# Patient Record
Sex: FEMALE | Race: OTHER | NOT HISPANIC OR LATINO | URBAN - METROPOLITAN AREA
[De-identification: names, ages, dates, MRNs, and addresses within clinical notes are randomized per-mention and may not be internally consistent; named-entity substitution may affect disease eponyms.]

---

## 2019-01-29 ENCOUNTER — OUTPATIENT (OUTPATIENT)
Dept: OUTPATIENT SERVICES | Facility: HOSPITAL | Age: 39
LOS: 1 days | End: 2019-01-29
Payer: COMMERCIAL

## 2019-01-29 DIAGNOSIS — O26.899 OTHER SPECIFIED PREGNANCY RELATED CONDITIONS, UNSPECIFIED TRIMESTER: ICD-10-CM

## 2019-01-29 DIAGNOSIS — Z3A.00 WEEKS OF GESTATION OF PREGNANCY NOT SPECIFIED: ICD-10-CM

## 2019-01-30 PROCEDURE — 59025 FETAL NON-STRESS TEST: CPT

## 2019-01-30 PROCEDURE — 99214 OFFICE O/P EST MOD 30 MIN: CPT

## 2019-01-30 PROCEDURE — 76817 TRANSVAGINAL US OBSTETRIC: CPT

## 2019-02-21 PROBLEM — Z00.00 ENCOUNTER FOR PREVENTIVE HEALTH EXAMINATION: Status: ACTIVE | Noted: 2019-02-21

## 2019-03-18 ENCOUNTER — OUTPATIENT (OUTPATIENT)
Dept: OUTPATIENT SERVICES | Facility: HOSPITAL | Age: 39
LOS: 1 days | End: 2019-03-18
Payer: COMMERCIAL

## 2019-03-18 ENCOUNTER — TRANSCRIPTION ENCOUNTER (OUTPATIENT)
Age: 39
End: 2019-03-18

## 2019-03-18 DIAGNOSIS — O26.899 OTHER SPECIFIED PREGNANCY RELATED CONDITIONS, UNSPECIFIED TRIMESTER: ICD-10-CM

## 2019-03-18 DIAGNOSIS — Z3A.00 WEEKS OF GESTATION OF PREGNANCY NOT SPECIFIED: ICD-10-CM

## 2019-03-18 LAB
ALBUMIN SERPL ELPH-MCNC: 3.6 G/DL — SIGNIFICANT CHANGE UP (ref 3.3–5)
ALP SERPL-CCNC: 160 U/L — HIGH (ref 40–120)
ALT FLD-CCNC: 20 U/L — SIGNIFICANT CHANGE UP (ref 10–45)
ANION GAP SERPL CALC-SCNC: 15 MMOL/L — SIGNIFICANT CHANGE UP (ref 5–17)
APPEARANCE UR: CLEAR — SIGNIFICANT CHANGE UP
APTT BLD: 27.7 SEC — SIGNIFICANT CHANGE UP (ref 27.5–36.3)
AST SERPL-CCNC: 18 U/L — SIGNIFICANT CHANGE UP (ref 10–40)
BASOPHILS # BLD AUTO: 0.02 K/UL — SIGNIFICANT CHANGE UP (ref 0–0.2)
BASOPHILS NFR BLD AUTO: 0.2 % — SIGNIFICANT CHANGE UP (ref 0–2)
BILIRUB SERPL-MCNC: 0.2 MG/DL — SIGNIFICANT CHANGE UP (ref 0.2–1.2)
BILIRUB UR-MCNC: NEGATIVE — SIGNIFICANT CHANGE UP
BUN SERPL-MCNC: 9 MG/DL — SIGNIFICANT CHANGE UP (ref 7–23)
CALCIUM SERPL-MCNC: 8.8 MG/DL — SIGNIFICANT CHANGE UP (ref 8.4–10.5)
CHLORIDE SERPL-SCNC: 102 MMOL/L — SIGNIFICANT CHANGE UP (ref 96–108)
CO2 SERPL-SCNC: 19 MMOL/L — LOW (ref 22–31)
COLOR SPEC: YELLOW — SIGNIFICANT CHANGE UP
CREAT ?TM UR-MCNC: 51 MG/DL — SIGNIFICANT CHANGE UP
CREAT SERPL-MCNC: 0.48 MG/DL — LOW (ref 0.5–1.3)
DIFF PNL FLD: NEGATIVE — SIGNIFICANT CHANGE UP
EOSINOPHIL # BLD AUTO: 0.08 K/UL — SIGNIFICANT CHANGE UP (ref 0–0.5)
EOSINOPHIL NFR BLD AUTO: 0.8 % — SIGNIFICANT CHANGE UP (ref 0–6)
FIBRINOGEN PPP-MCNC: 575 MG/DL — HIGH (ref 258–438)
GLUCOSE SERPL-MCNC: 93 MG/DL — SIGNIFICANT CHANGE UP (ref 70–99)
GLUCOSE UR QL: NEGATIVE — SIGNIFICANT CHANGE UP
HCT VFR BLD CALC: 37.3 % — SIGNIFICANT CHANGE UP (ref 34.5–45)
HGB BLD-MCNC: 12.7 G/DL — SIGNIFICANT CHANGE UP (ref 11.5–15.5)
IMM GRANULOCYTES NFR BLD AUTO: 1.9 % — HIGH (ref 0–1.5)
INR BLD: 0.91 — SIGNIFICANT CHANGE UP (ref 0.88–1.16)
KETONES UR-MCNC: NEGATIVE — SIGNIFICANT CHANGE UP
LDH SERPL L TO P-CCNC: 171 U/L — SIGNIFICANT CHANGE UP (ref 50–242)
LEUKOCYTE ESTERASE UR-ACNC: ABNORMAL
LYMPHOCYTES # BLD AUTO: 1.29 K/UL — SIGNIFICANT CHANGE UP (ref 1–3.3)
LYMPHOCYTES # BLD AUTO: 13 % — SIGNIFICANT CHANGE UP (ref 13–44)
MCHC RBC-ENTMCNC: 31.8 PG — SIGNIFICANT CHANGE UP (ref 27–34)
MCHC RBC-ENTMCNC: 34 GM/DL — SIGNIFICANT CHANGE UP (ref 32–36)
MCV RBC AUTO: 93.5 FL — SIGNIFICANT CHANGE UP (ref 80–100)
MONOCYTES # BLD AUTO: 0.78 K/UL — SIGNIFICANT CHANGE UP (ref 0–0.9)
MONOCYTES NFR BLD AUTO: 7.9 % — SIGNIFICANT CHANGE UP (ref 2–14)
NEUTROPHILS # BLD AUTO: 7.54 K/UL — HIGH (ref 1.8–7.4)
NEUTROPHILS NFR BLD AUTO: 76.2 % — SIGNIFICANT CHANGE UP (ref 43–77)
NITRITE UR-MCNC: NEGATIVE — SIGNIFICANT CHANGE UP
NRBC # BLD: 0 /100 WBCS — SIGNIFICANT CHANGE UP (ref 0–0)
PH UR: 7 — SIGNIFICANT CHANGE UP (ref 5–8)
PLATELET # BLD AUTO: 275 K/UL — SIGNIFICANT CHANGE UP (ref 150–400)
POTASSIUM SERPL-MCNC: 4 MMOL/L — SIGNIFICANT CHANGE UP (ref 3.5–5.3)
POTASSIUM SERPL-SCNC: 4 MMOL/L — SIGNIFICANT CHANGE UP (ref 3.5–5.3)
PROT ?TM UR-MCNC: 12 MG/DL — SIGNIFICANT CHANGE UP (ref 0–12)
PROT SERPL-MCNC: 6.9 G/DL — SIGNIFICANT CHANGE UP (ref 6–8.3)
PROT UR-MCNC: NEGATIVE MG/DL — SIGNIFICANT CHANGE UP
PROT/CREAT UR-RTO: 0.2 RATIO — SIGNIFICANT CHANGE UP (ref 0–0.2)
PROTHROM AB SERPL-ACNC: 10.3 SEC — SIGNIFICANT CHANGE UP (ref 10–12.9)
RBC # BLD: 3.99 M/UL — SIGNIFICANT CHANGE UP (ref 3.8–5.2)
RBC # FLD: 12.6 % — SIGNIFICANT CHANGE UP (ref 10.3–14.5)
SODIUM SERPL-SCNC: 136 MMOL/L — SIGNIFICANT CHANGE UP (ref 135–145)
SP GR SPEC: 1.01 — SIGNIFICANT CHANGE UP (ref 1–1.03)
URATE SERPL-MCNC: 4.6 MG/DL — SIGNIFICANT CHANGE UP (ref 2.5–7)
UROBILINOGEN FLD QL: 0.2 E.U./DL — SIGNIFICANT CHANGE UP
WBC # BLD: 9.9 K/UL — SIGNIFICANT CHANGE UP (ref 3.8–10.5)
WBC # FLD AUTO: 9.9 K/UL — SIGNIFICANT CHANGE UP (ref 3.8–10.5)

## 2019-03-18 PROCEDURE — 85025 COMPLETE CBC W/AUTO DIFF WBC: CPT

## 2019-03-18 PROCEDURE — 85384 FIBRINOGEN ACTIVITY: CPT

## 2019-03-18 PROCEDURE — 81001 URINALYSIS AUTO W/SCOPE: CPT

## 2019-03-18 PROCEDURE — 84156 ASSAY OF PROTEIN URINE: CPT

## 2019-03-18 PROCEDURE — 36415 COLL VENOUS BLD VENIPUNCTURE: CPT

## 2019-03-18 PROCEDURE — 99214 OFFICE O/P EST MOD 30 MIN: CPT

## 2019-03-18 PROCEDURE — 85610 PROTHROMBIN TIME: CPT

## 2019-03-18 PROCEDURE — 82570 ASSAY OF URINE CREATININE: CPT

## 2019-03-18 PROCEDURE — 80053 COMPREHEN METABOLIC PANEL: CPT

## 2019-03-18 PROCEDURE — 84550 ASSAY OF BLOOD/URIC ACID: CPT

## 2019-03-18 PROCEDURE — 83615 LACTATE (LD) (LDH) ENZYME: CPT

## 2019-03-18 PROCEDURE — 85730 THROMBOPLASTIN TIME PARTIAL: CPT

## 2019-03-20 ENCOUNTER — RESULT REVIEW (OUTPATIENT)
Age: 39
End: 2019-03-20

## 2019-03-21 ENCOUNTER — INPATIENT (INPATIENT)
Facility: HOSPITAL | Age: 39
LOS: 2 days | Discharge: HOME | End: 2019-03-24
Attending: OBSTETRICS & GYNECOLOGY | Admitting: OBSTETRICS & GYNECOLOGY
Payer: COMMERCIAL

## 2019-03-21 VITALS
HEART RATE: 94 BPM | SYSTOLIC BLOOD PRESSURE: 117 MMHG | HEIGHT: 61 IN | WEIGHT: 147.71 LBS | DIASTOLIC BLOOD PRESSURE: 74 MMHG

## 2019-03-21 LAB
BASOPHILS # BLD AUTO: 0.02 K/UL — SIGNIFICANT CHANGE UP (ref 0–0.2)
BASOPHILS NFR BLD AUTO: 0.2 % — SIGNIFICANT CHANGE UP (ref 0–2)
BLD GP AB SCN SERPL QL: NEGATIVE — SIGNIFICANT CHANGE UP
EOSINOPHIL # BLD AUTO: 0.08 K/UL — SIGNIFICANT CHANGE UP (ref 0–0.5)
EOSINOPHIL NFR BLD AUTO: 0.8 % — SIGNIFICANT CHANGE UP (ref 0–6)
HCT VFR BLD CALC: 36.6 % — SIGNIFICANT CHANGE UP (ref 34.5–45)
HGB BLD-MCNC: 12.6 G/DL — SIGNIFICANT CHANGE UP (ref 11.5–15.5)
IMM GRANULOCYTES NFR BLD AUTO: 1.8 % — HIGH (ref 0–1.5)
LYMPHOCYTES # BLD AUTO: 1.42 K/UL — SIGNIFICANT CHANGE UP (ref 1–3.3)
LYMPHOCYTES # BLD AUTO: 14.1 % — SIGNIFICANT CHANGE UP (ref 13–44)
MCHC RBC-ENTMCNC: 32.5 PG — SIGNIFICANT CHANGE UP (ref 27–34)
MCHC RBC-ENTMCNC: 34.4 GM/DL — SIGNIFICANT CHANGE UP (ref 32–36)
MCV RBC AUTO: 94.3 FL — SIGNIFICANT CHANGE UP (ref 80–100)
MONOCYTES # BLD AUTO: 0.76 K/UL — SIGNIFICANT CHANGE UP (ref 0–0.9)
MONOCYTES NFR BLD AUTO: 7.5 % — SIGNIFICANT CHANGE UP (ref 2–14)
NEUTROPHILS # BLD AUTO: 7.62 K/UL — HIGH (ref 1.8–7.4)
NEUTROPHILS NFR BLD AUTO: 75.6 % — SIGNIFICANT CHANGE UP (ref 43–77)
NRBC # BLD: 0 /100 WBCS — SIGNIFICANT CHANGE UP (ref 0–0)
PLATELET # BLD AUTO: 264 K/UL — SIGNIFICANT CHANGE UP (ref 150–400)
RBC # BLD: 3.88 M/UL — SIGNIFICANT CHANGE UP (ref 3.8–5.2)
RBC # FLD: 12.7 % — SIGNIFICANT CHANGE UP (ref 10.3–14.5)
RH IG SCN BLD-IMP: POSITIVE — SIGNIFICANT CHANGE UP
WBC # BLD: 10.08 K/UL — SIGNIFICANT CHANGE UP (ref 3.8–10.5)
WBC # FLD AUTO: 10.08 K/UL — SIGNIFICANT CHANGE UP (ref 3.8–10.5)

## 2019-03-21 RX ORDER — GLYCERIN ADULT
1 SUPPOSITORY, RECTAL RECTAL AT BEDTIME
Qty: 0 | Refills: 0 | Status: DISCONTINUED | OUTPATIENT
Start: 2019-03-21 | End: 2019-03-24

## 2019-03-21 RX ORDER — HEPARIN SODIUM 5000 [USP'U]/ML
5000 INJECTION INTRAVENOUS; SUBCUTANEOUS EVERY 12 HOURS
Qty: 0 | Refills: 0 | Status: DISCONTINUED | OUTPATIENT
Start: 2019-03-22 | End: 2019-03-24

## 2019-03-21 RX ORDER — OXYCODONE AND ACETAMINOPHEN 5; 325 MG/1; MG/1
1 TABLET ORAL
Qty: 0 | Refills: 0 | Status: DISCONTINUED | OUTPATIENT
Start: 2019-03-21 | End: 2019-03-24

## 2019-03-21 RX ORDER — NALOXONE HYDROCHLORIDE 4 MG/.1ML
0.1 SPRAY NASAL
Qty: 0 | Refills: 0 | Status: DISCONTINUED | OUTPATIENT
Start: 2019-03-21 | End: 2019-03-24

## 2019-03-21 RX ORDER — OXYTOCIN 10 UNIT/ML
333.33 VIAL (ML) INJECTION
Qty: 20 | Refills: 0 | Status: COMPLETED | OUTPATIENT
Start: 2019-03-21 | End: 2019-03-21

## 2019-03-21 RX ORDER — METOCLOPRAMIDE HCL 10 MG
10 TABLET ORAL ONCE
Qty: 0 | Refills: 0 | Status: DISCONTINUED | OUTPATIENT
Start: 2019-03-21 | End: 2019-03-21

## 2019-03-21 RX ORDER — LANOLIN
1 OINTMENT (GRAM) TOPICAL
Qty: 0 | Refills: 0 | Status: DISCONTINUED | OUTPATIENT
Start: 2019-03-21 | End: 2019-03-24

## 2019-03-21 RX ORDER — SODIUM CHLORIDE 9 MG/ML
1000 INJECTION, SOLUTION INTRAVENOUS
Qty: 0 | Refills: 0 | Status: DISCONTINUED | OUTPATIENT
Start: 2019-03-21 | End: 2019-03-24

## 2019-03-21 RX ORDER — OXYCODONE AND ACETAMINOPHEN 5; 325 MG/1; MG/1
2 TABLET ORAL EVERY 6 HOURS
Qty: 0 | Refills: 0 | Status: DISCONTINUED | OUTPATIENT
Start: 2019-03-21 | End: 2019-03-24

## 2019-03-21 RX ORDER — IBUPROFEN 200 MG
600 TABLET ORAL EVERY 6 HOURS
Qty: 0 | Refills: 0 | Status: DISCONTINUED | OUTPATIENT
Start: 2019-03-21 | End: 2019-03-24

## 2019-03-21 RX ORDER — OXYTOCIN 10 UNIT/ML
41.67 VIAL (ML) INJECTION
Qty: 20 | Refills: 0 | Status: DISCONTINUED | OUTPATIENT
Start: 2019-03-21 | End: 2019-03-24

## 2019-03-21 RX ORDER — SIMETHICONE 80 MG/1
80 TABLET, CHEWABLE ORAL EVERY 4 HOURS
Qty: 0 | Refills: 0 | Status: DISCONTINUED | OUTPATIENT
Start: 2019-03-21 | End: 2019-03-24

## 2019-03-21 RX ORDER — CEFAZOLIN SODIUM 1 G
2000 VIAL (EA) INJECTION ONCE
Qty: 0 | Refills: 0 | Status: COMPLETED | OUTPATIENT
Start: 2019-03-21 | End: 2019-03-21

## 2019-03-21 RX ORDER — ACETAMINOPHEN 500 MG
650 TABLET ORAL EVERY 6 HOURS
Qty: 0 | Refills: 0 | Status: DISCONTINUED | OUTPATIENT
Start: 2019-03-21 | End: 2019-03-24

## 2019-03-21 RX ORDER — SODIUM CHLORIDE 9 MG/ML
1000 INJECTION, SOLUTION INTRAVENOUS
Qty: 0 | Refills: 0 | Status: DISCONTINUED | OUTPATIENT
Start: 2019-03-21 | End: 2019-03-21

## 2019-03-21 RX ORDER — DIPHENHYDRAMINE HCL 50 MG
25 CAPSULE ORAL EVERY 6 HOURS
Qty: 0 | Refills: 0 | Status: DISCONTINUED | OUTPATIENT
Start: 2019-03-21 | End: 2019-03-24

## 2019-03-21 RX ORDER — FERROUS SULFATE 325(65) MG
325 TABLET ORAL DAILY
Qty: 0 | Refills: 0 | Status: DISCONTINUED | OUTPATIENT
Start: 2019-03-21 | End: 2019-03-24

## 2019-03-21 RX ORDER — SODIUM CHLORIDE 9 MG/ML
1000 INJECTION, SOLUTION INTRAVENOUS ONCE
Qty: 0 | Refills: 0 | Status: DISCONTINUED | OUTPATIENT
Start: 2019-03-21 | End: 2019-03-21

## 2019-03-21 RX ORDER — DOCUSATE SODIUM 100 MG
100 CAPSULE ORAL
Qty: 0 | Refills: 0 | Status: DISCONTINUED | OUTPATIENT
Start: 2019-03-21 | End: 2019-03-24

## 2019-03-21 RX ORDER — ONDANSETRON 8 MG/1
4 TABLET, FILM COATED ORAL EVERY 6 HOURS
Qty: 0 | Refills: 0 | Status: DISCONTINUED | OUTPATIENT
Start: 2019-03-21 | End: 2019-03-24

## 2019-03-21 RX ORDER — CITRIC ACID/SODIUM CITRATE 300-500 MG
30 SOLUTION, ORAL ORAL ONCE
Qty: 0 | Refills: 0 | Status: COMPLETED | OUTPATIENT
Start: 2019-03-21 | End: 2019-03-21

## 2019-03-21 RX ORDER — TETANUS TOXOID, REDUCED DIPHTHERIA TOXOID AND ACELLULAR PERTUSSIS VACCINE, ADSORBED 5; 2.5; 8; 8; 2.5 [IU]/.5ML; [IU]/.5ML; UG/.5ML; UG/.5ML; UG/.5ML
0.5 SUSPENSION INTRAMUSCULAR ONCE
Qty: 0 | Refills: 0 | Status: DISCONTINUED | OUTPATIENT
Start: 2019-03-21 | End: 2019-03-24

## 2019-03-21 RX ADMIN — Medication 125 MILLIUNIT(S)/MIN: at 15:44

## 2019-03-21 RX ADMIN — Medication 600 MILLIGRAM(S): at 18:25

## 2019-03-21 RX ADMIN — Medication 30 MILLILITER(S): at 11:31

## 2019-03-21 RX ADMIN — Medication 1000 MILLIUNIT(S)/MIN: at 15:41

## 2019-03-21 RX ADMIN — Medication 100 MILLIGRAM(S): at 11:31

## 2019-03-22 LAB
BASOPHILS # BLD AUTO: 0.02 K/UL — SIGNIFICANT CHANGE UP (ref 0–0.2)
BASOPHILS NFR BLD AUTO: 0.1 % — SIGNIFICANT CHANGE UP (ref 0–2)
EOSINOPHIL # BLD AUTO: 0.01 K/UL — SIGNIFICANT CHANGE UP (ref 0–0.5)
EOSINOPHIL NFR BLD AUTO: 0.1 % — SIGNIFICANT CHANGE UP (ref 0–6)
HCT VFR BLD CALC: 31.2 % — LOW (ref 34.5–45)
HGB BLD-MCNC: 10.8 G/DL — LOW (ref 11.5–15.5)
IMM GRANULOCYTES NFR BLD AUTO: 0.7 % — SIGNIFICANT CHANGE UP (ref 0–1.5)
LYMPHOCYTES # BLD AUTO: 1.77 K/UL — SIGNIFICANT CHANGE UP (ref 1–3.3)
LYMPHOCYTES # BLD AUTO: 10.5 % — LOW (ref 13–44)
MCHC RBC-ENTMCNC: 32.5 PG — SIGNIFICANT CHANGE UP (ref 27–34)
MCHC RBC-ENTMCNC: 34.6 GM/DL — SIGNIFICANT CHANGE UP (ref 32–36)
MCV RBC AUTO: 94 FL — SIGNIFICANT CHANGE UP (ref 80–100)
MONOCYTES # BLD AUTO: 1.18 K/UL — HIGH (ref 0–0.9)
MONOCYTES NFR BLD AUTO: 7 % — SIGNIFICANT CHANGE UP (ref 2–14)
NEUTROPHILS # BLD AUTO: 13.75 K/UL — HIGH (ref 1.8–7.4)
NEUTROPHILS NFR BLD AUTO: 81.6 % — HIGH (ref 43–77)
NRBC # BLD: 0 /100 WBCS — SIGNIFICANT CHANGE UP (ref 0–0)
PLATELET # BLD AUTO: 222 K/UL — SIGNIFICANT CHANGE UP (ref 150–400)
RBC # BLD: 3.32 M/UL — LOW (ref 3.8–5.2)
RBC # FLD: 12.4 % — SIGNIFICANT CHANGE UP (ref 10.3–14.5)
T PALLIDUM AB TITR SER: NEGATIVE — SIGNIFICANT CHANGE UP
WBC # BLD: 16.85 K/UL — HIGH (ref 3.8–10.5)
WBC # FLD AUTO: 16.85 K/UL — HIGH (ref 3.8–10.5)

## 2019-03-22 RX ADMIN — Medication 100 MILLIGRAM(S): at 10:19

## 2019-03-22 RX ADMIN — Medication 600 MILLIGRAM(S): at 19:30

## 2019-03-22 RX ADMIN — HEPARIN SODIUM 5000 UNIT(S): 5000 INJECTION INTRAVENOUS; SUBCUTANEOUS at 12:13

## 2019-03-22 RX ADMIN — Medication 1 TABLET(S): at 12:13

## 2019-03-22 RX ADMIN — Medication 600 MILLIGRAM(S): at 12:13

## 2019-03-22 RX ADMIN — Medication 650 MILLIGRAM(S): at 16:50

## 2019-03-22 RX ADMIN — Medication 650 MILLIGRAM(S): at 10:19

## 2019-03-22 RX ADMIN — Medication 600 MILLIGRAM(S): at 07:06

## 2019-03-22 RX ADMIN — Medication 600 MILLIGRAM(S): at 18:30

## 2019-03-22 RX ADMIN — Medication 650 MILLIGRAM(S): at 17:50

## 2019-03-22 RX ADMIN — Medication 600 MILLIGRAM(S): at 01:05

## 2019-03-22 RX ADMIN — Medication 325 MILLIGRAM(S): at 12:14

## 2019-03-22 RX ADMIN — Medication 600 MILLIGRAM(S): at 13:30

## 2019-03-22 RX ADMIN — Medication 650 MILLIGRAM(S): at 11:20

## 2019-03-22 RX ADMIN — Medication 600 MILLIGRAM(S): at 06:53

## 2019-03-22 RX ADMIN — Medication 600 MILLIGRAM(S): at 00:30

## 2019-03-22 RX ADMIN — HEPARIN SODIUM 5000 UNIT(S): 5000 INJECTION INTRAVENOUS; SUBCUTANEOUS at 00:29

## 2019-03-22 NOTE — PROGRESS NOTE ADULT - SUBJECTIVE AND OBJECTIVE BOX
Patient evaluated at bedside.   She reports pain is well controlled.  She denies nausea, vomiting or heavy vaginal bleeding.  She has been not been OOB, kim in place, but is passing gas, tolerating regular diet and is breastfeeding.    Physical Exam:  Vital Signs Last 24 Hrs  T(C): 36.3 (22 Mar 2019 06:00), Max: 36.7 (21 Mar 2019 17:10)  T(F): 97.4 (22 Mar 2019 06:00), Max: 98.1 (21 Mar 2019 17:10)  HR: 64 (22 Mar 2019 06:00) (60 - 92)  BP: 104/58 (22 Mar 2019 06:00) (104/58 - 122/59)  BP(mean): 84 (21 Mar 2019 15:10) (84 - 92)  RR: 18 (22 Mar 2019 06:00) (16 - 18)  SpO2: 96% (22 Mar 2019 06:00) (93% - 100%)    GA: NAD, A+0 x 3  Abd: + BS, soft, nontender, nondistended, no rebound or guarding, incision clean, dry and intact, pressure dressing removed, steristrips in place, uterus firm at midline,  fb below umbilicus  : lochia WNL  Kim:   Extremities: no swelling or calf tenderness                            10.8   16.85 )-----------( 222      ( 22 Mar 2019 06:39 )             31.2

## 2019-03-22 NOTE — LACTATION INITIAL EVALUATION - REQUESTED BY
lactation rounds, initial visit. Primaparous mother delivered @ 374/7 weeks gestation via c/s. The  was ~ 27 hours old when I consulted @ the bedside. The  has had 2 voids and 4 stools documented since birth. The bedside nurse and the mother reported that the  has been latching on and breast feeding. The  was placed S2S with the mother in the laid back position and the  latched onto the left breast with a deep latch. Rhythmic sucking with pauses were observed and swallowing was noted. The mother reported a tugging / pulling sensation of her nipple when the  was sucking and denied a pinching / biting sensation. The  came off of the breast after 15 minutes and then was latched to the right breast.  Breast feeding guidelines, positioning the  to the breast, latching strategies, early feeding cues, cluster feedings, hand expression and 's output requirements reviewed with the mother. Written literature was given to the mother on the above. S2S was encouraged. All questions were answered and the mother verbalized understanding. I encouraged the mother to ask for lactation assistance as needed.

## 2019-03-22 NOTE — PROGRESS NOTE ADULT - SUBJECTIVE AND OBJECTIVE BOX
POD 1  Pt without complaints  VSS   Abd soft NT  Ext no calf tenderness  Inc c/d/i  Lochia mild    A: Stable  P: cont care      Advance diet and activity      Continue care

## 2019-03-22 NOTE — PROGRESS NOTE ADULT - ASSESSMENT
A/P   38y  s/p c/s, POD #1, stable  -  Pain: PO motrin, percocet  -  GI: reg diet  -  : voids  -  DVT prophylaxis: ambulation, SCDs, SQH  -  Dispo: POD 3 or 4

## 2019-03-23 ENCOUNTER — TRANSCRIPTION ENCOUNTER (OUTPATIENT)
Age: 39
End: 2019-03-23

## 2019-03-23 RX ADMIN — Medication 100 MILLIGRAM(S): at 13:02

## 2019-03-23 RX ADMIN — Medication 325 MILLIGRAM(S): at 13:02

## 2019-03-23 RX ADMIN — HEPARIN SODIUM 5000 UNIT(S): 5000 INJECTION INTRAVENOUS; SUBCUTANEOUS at 13:02

## 2019-03-23 RX ADMIN — Medication 650 MILLIGRAM(S): at 07:47

## 2019-03-23 RX ADMIN — Medication 600 MILLIGRAM(S): at 00:40

## 2019-03-23 RX ADMIN — Medication 650 MILLIGRAM(S): at 00:00

## 2019-03-23 RX ADMIN — Medication 600 MILLIGRAM(S): at 00:48

## 2019-03-23 RX ADMIN — HEPARIN SODIUM 5000 UNIT(S): 5000 INJECTION INTRAVENOUS; SUBCUTANEOUS at 00:40

## 2019-03-23 RX ADMIN — Medication 600 MILLIGRAM(S): at 13:01

## 2019-03-23 RX ADMIN — Medication 600 MILLIGRAM(S): at 07:47

## 2019-03-23 RX ADMIN — Medication 600 MILLIGRAM(S): at 14:00

## 2019-03-23 RX ADMIN — Medication 650 MILLIGRAM(S): at 07:33

## 2019-03-23 RX ADMIN — Medication 600 MILLIGRAM(S): at 18:49

## 2019-03-23 RX ADMIN — Medication 650 MILLIGRAM(S): at 00:39

## 2019-03-23 RX ADMIN — Medication 1 TABLET(S): at 13:01

## 2019-03-23 RX ADMIN — Medication 600 MILLIGRAM(S): at 07:00

## 2019-03-23 NOTE — PROGRESS NOTE ADULT - SUBJECTIVE AND OBJECTIVE BOX
Patient evaluated at bedside.   She reports pain is well controlled.  She denies headache, dizziness, chest pain, palpitations, shortness of breathe, nausea, vomiting or heavy vaginal bleeding.  She has been ambulating without assistance, voiding spontaneously, passing gas, tolerating regular diet and is breastfeeding.    Physical Exam:  Vital Signs Last 24 Hrs  T(C): 36.6 (23 Mar 2019 06:00), Max: 36.6 (23 Mar 2019 06:00)  T(F): 97.9 (23 Mar 2019 06:00), Max: 97.9 (23 Mar 2019 06:00)  HR: 66 (23 Mar 2019 06:00) (61 - 68)  BP: 118/78 (23 Mar 2019 06:00) (106/72 - 118/78)  BP(mean): --  RR: 18 (23 Mar 2019 06:00) (18 - 18)  SpO2: 98% (23 Mar 2019 06:00) (98% - 98%)    03-22 @ 07:01  -  03-23 @ 07:00  --------------------------------------------------------  IN: 0 mL / OUT: 1330 mL / NET: -1330 mL        GA: NAD, A+0 x 3  CV: RRR  Pulm: Normal work of breathing  Breasts: soft, nontender, no palpable masses  Abd: + BS, soft, nontender, nondistended, no rebound or guarding, uterus firm at midline,  fundus below umbilicus  Incision: well approximated, no erythema or discharge  : lochia WNL  Extremities: no swelling or calf tenderness                            10.8   16.85 )-----------( 222      ( 22 Mar 2019 06:39 )             31.2

## 2019-03-23 NOTE — PROGRESS NOTE ADULT - ASSESSMENT
38y Female POD#2    s/p C/S, Uncomplicated                                       1. Neuro/Pain:  OPM  2  CV:  VS per routine  3. Pulm: Encourage ISS & Ambulation  4. GI:  Reg  5. : Voiding  6. DVT ppx: SCDs, SQH 5000 mg BID  7. Dispo: POD #3 or #4

## 2019-03-23 NOTE — DISCHARGE NOTE OB - CARE PROVIDER_API CALL
Chelsea Saldana)  Obstetrics and Gynecology  [address] [facility], [place] [address]  Phone: [phone]  Fax: [phone]  Follow Up Time:

## 2019-03-23 NOTE — PROGRESS NOTE ADULT - SUBJECTIVE AND OBJECTIVE BOX
POD 2  Pt without complaints   VSS  Abd Soft NT  Ext no calf tenderness  Lochia mild  Inc C/D/I    A: stable  P: cont care  D/C home tomorrow

## 2019-03-23 NOTE — DISCHARGE NOTE OB - PATIENT PORTAL LINK FT
You can access the [facility] Patient Portal, offered by [facility], by registering with the following website: [email or web address]

## 2019-03-23 NOTE — DISCHARGE NOTE OB - CARE PLAN
Goal:	to home  Assessment and plan of treatment:	see below Principal Discharge DX:	Postpartum care and examination  Goal:	to home  Assessment and plan of treatment:	see below

## 2019-03-24 VITALS
RESPIRATION RATE: 18 BRPM | TEMPERATURE: 98 F | HEART RATE: 80 BPM | OXYGEN SATURATION: 98 % | DIASTOLIC BLOOD PRESSURE: 80 MMHG | SYSTOLIC BLOOD PRESSURE: 126 MMHG

## 2019-03-24 PROCEDURE — 86780 TREPONEMA PALLIDUM: CPT

## 2019-03-24 PROCEDURE — 88307 TISSUE EXAM BY PATHOLOGIST: CPT

## 2019-03-24 PROCEDURE — C1889: CPT

## 2019-03-24 PROCEDURE — C1765: CPT

## 2019-03-24 PROCEDURE — 36415 COLL VENOUS BLD VENIPUNCTURE: CPT

## 2019-03-24 PROCEDURE — 86900 BLOOD TYPING SEROLOGIC ABO: CPT

## 2019-03-24 PROCEDURE — 86901 BLOOD TYPING SEROLOGIC RH(D): CPT

## 2019-03-24 PROCEDURE — 85025 COMPLETE CBC W/AUTO DIFF WBC: CPT

## 2019-03-24 PROCEDURE — 86850 RBC ANTIBODY SCREEN: CPT

## 2019-03-24 RX ADMIN — Medication 600 MILLIGRAM(S): at 01:40

## 2019-03-24 RX ADMIN — Medication 650 MILLIGRAM(S): at 11:01

## 2019-03-24 RX ADMIN — Medication 1 TABLET(S): at 11:01

## 2019-03-24 RX ADMIN — Medication 100 MILLIGRAM(S): at 11:01

## 2019-03-24 RX ADMIN — Medication 600 MILLIGRAM(S): at 07:06

## 2019-03-24 RX ADMIN — Medication 600 MILLIGRAM(S): at 06:49

## 2019-03-24 RX ADMIN — HEPARIN SODIUM 5000 UNIT(S): 5000 INJECTION INTRAVENOUS; SUBCUTANEOUS at 01:11

## 2019-03-24 RX ADMIN — Medication 600 MILLIGRAM(S): at 01:11

## 2019-03-24 RX ADMIN — Medication 325 MILLIGRAM(S): at 11:01

## 2019-03-24 RX ADMIN — Medication 650 MILLIGRAM(S): at 11:43

## 2019-03-24 RX ADMIN — Medication 100 MILLIGRAM(S): at 01:11

## 2019-03-24 NOTE — PROGRESS NOTE ADULT - SUBJECTIVE AND OBJECTIVE BOX
Patient evaluated at bedside.   She reports pain is well controlled.  She denies headache, dizziness, chest pain, palpitations, shortness of breathe, nausea, vomiting or heavy vaginal bleeding.  She has been ambulating without assistance, voiding spontaneously, passing gas, tolerating regular diet and is breastfeeding. No complaints at this time.     Physical Exam:  Vital Signs Last 24 Hrs  T(C): 36.2 (24 Mar 2019 06:54), Max: 36.7 (23 Mar 2019 11:07)  T(F): 97.2 (24 Mar 2019 06:54), Max: 98 (23 Mar 2019 11:07)  HR: 70 (24 Mar 2019 06:54) (70 - 90)  BP: 131/63 (24 Mar 2019 06:54) (106/67 - 131/63)  BP(mean): --  RR: 18 (24 Mar 2019 06:54) (17 - 18)  SpO2: 97% (24 Mar 2019 06:54) (96% - 98%)      GA: NAD, resting comfortably   Abd: + BS, soft, appropriately tender, mildly distended, no rebound or guarding, uterus firm at midline,  fb below umbilicus  Incision: well approximated, no erythema or discharge  : lochia WNL  Extremities: no swelling or calf tenderness

## 2019-03-24 NOTE — PROGRESS NOTE ADULT - ASSESSMENT
A/P  37yo s/p c/s, POD #3, stable and meeting postoperative milestones appropriately.   1. Pain: controlled with OPM.   2. GI: Regular diet as tolerated   3. : voiding   4. DVT prophylaxis: ambulation, Subcutaneous heparin   5. Dispo: Likely POD3 or 4

## 2019-03-26 DIAGNOSIS — O32.8XX0 MATERNAL CARE FOR OTHER MALPRESENTATION OF FETUS, NOT APPLICABLE OR UNSPECIFIED: ICD-10-CM

## 2019-03-26 DIAGNOSIS — Z3A.37 37 WEEKS GESTATION OF PREGNANCY: ICD-10-CM

## 2019-03-26 DIAGNOSIS — O36.5930 MATERNAL CARE FOR OTHER KNOWN OR SUSPECTED POOR FETAL GROWTH, THIRD TRIMESTER, NOT APPLICABLE OR UNSPECIFIED: ICD-10-CM

## 2019-03-26 DIAGNOSIS — Z34.03 ENCOUNTER FOR SUPERVISION OF NORMAL FIRST PREGNANCY, THIRD TRIMESTER: ICD-10-CM

## 2019-04-03 LAB — SURGICAL PATHOLOGY STUDY: SIGNIFICANT CHANGE UP

## 2022-05-13 ENCOUNTER — APPOINTMENT (OUTPATIENT)
Dept: ANTEPARTUM | Facility: CLINIC | Age: 42
End: 2022-05-13
Payer: COMMERCIAL

## 2022-05-13 ENCOUNTER — ASOB RESULT (OUTPATIENT)
Age: 42
End: 2022-05-13

## 2022-05-13 PROCEDURE — 76813 OB US NUCHAL MEAS 1 GEST: CPT

## 2022-05-13 PROCEDURE — 76801 OB US < 14 WKS SINGLE FETUS: CPT

## 2022-06-08 ENCOUNTER — APPOINTMENT (OUTPATIENT)
Dept: ENDOCRINOLOGY | Facility: CLINIC | Age: 42
End: 2022-06-08
Payer: COMMERCIAL

## 2022-06-08 ENCOUNTER — APPOINTMENT (OUTPATIENT)
Dept: ANTEPARTUM | Facility: CLINIC | Age: 42
End: 2022-06-08
Payer: COMMERCIAL

## 2022-06-08 ENCOUNTER — ASOB RESULT (OUTPATIENT)
Age: 42
End: 2022-06-08

## 2022-06-08 VITALS
HEART RATE: 87 BPM | WEIGHT: 121 LBS | SYSTOLIC BLOOD PRESSURE: 111 MMHG | BODY MASS INDEX: 22.84 KG/M2 | HEIGHT: 61 IN | DIASTOLIC BLOOD PRESSURE: 75 MMHG

## 2022-06-08 DIAGNOSIS — Z34.92 ENCOUNTER FOR SUPERVISION OF NORMAL PREGNANCY, UNSPECIFIED, SECOND TRIMESTER: ICD-10-CM

## 2022-06-08 DIAGNOSIS — E03.8 OTHER SPECIFIED HYPOTHYROIDISM: ICD-10-CM

## 2022-06-08 DIAGNOSIS — E05.90 THYROTOXICOSIS, UNSPECIFIED W/OUT THYROTOXIC CRISIS OR STORM: ICD-10-CM

## 2022-06-08 PROCEDURE — 76811 OB US DETAILED SNGL FETUS: CPT

## 2022-06-08 PROCEDURE — 76817 TRANSVAGINAL US OBSTETRIC: CPT

## 2022-06-08 PROCEDURE — 99205 OFFICE O/P NEW HI 60 MIN: CPT

## 2022-06-08 NOTE — REVIEW OF SYSTEMS
[As Noted in HPI] : as noted in HPI [Fatigue] : fatigue [Negative] : Heme/Lymph [Palpitations] : no palpitations [Nausea] : no nausea [Abdominal Pain] : no abdominal pain

## 2022-06-08 NOTE — ASSESSMENT
[FreeTextEntry1] : 41 year y/o F pt with:\par Subclinical hyperthyroidism\par Patient's 16 weeks pregnant\par Patient presentation is c/w gestational transient thyrotoxicosis (GTT). Serum free T4 and T3 concentrations increases slightly, usually within the normal range, and  TSH concentrations are appropriate reduced.  Thus in some woman, the high serum concentration of hCG during early pregnancy can lead to subclinical or mild overt hyperthyroidism characterized by a slightly low serum TSH concentration and high normal/mildly elevated serum free T4. In the differential diagnosis Grave's is included \par \par Added hCG, TSH rab, TFTs, Free T4 and Free T3 to the specimen drawn 6/6/22\par Call pat with lab report\par Return in 2 months.\par \par

## 2022-06-08 NOTE — CONSULT LETTER
[Dear  ___] : Dear  [unfilled], [Consult Letter:] : I had the pleasure of evaluating your patient, [unfilled]. [Sincerely,] : Sincerely, [FreeTextEntry3] : Washington Lee\par

## 2022-06-08 NOTE — PHYSICAL EXAM
[Alert] : alert [Normal Sclera/Conjunctiva] : normal sclera/conjunctiva [Normal Outer Ear/Nose] : the ears and nose were normal in appearance [No Respiratory Distress] : no respiratory distress [Normal Rate] : heart rate was normal [Regular Rhythm] : with a regular rhythm [No Edema] : no peripheral edema [Soft] : abdomen soft [Spine Straight] : spine straight [No Stigmata of Cushings Syndrome] : no stigmata of Cushings Syndrome [Normal Gait] : normal gait [Normal Reflexes] : deep tendon reflexes were 2+ and symmetric [No Tremors] : no tremors [Oriented x3] : oriented to person, place, and time [Acanthosis Nigricans] : no acanthosis nigricans [de-identified] : \par 2 nodularities palpated in R lobe, ~ 0.8 cm round and small.

## 2022-06-08 NOTE — END OF VISIT
[Time Spent: ___ minutes] : I have spent [unfilled] minutes of time on the encounter. [FreeTextEntry3] : All medical record entries made by the Scribe were at my, Dr. Washington Lee, direction and personally dictated by me on 06/08/2022. I have reviewed the chart and agree that the record accurately reflects my personal performance of the history, physical exam, assessment and plan. I have also personally directed, reviewed and agreed with the chart.

## 2022-06-08 NOTE — ADDENDUM
[FreeTextEntry1] : I Dennis Milton act soley as a scribe for Dr. Washington Lee on this date. 06/08/2022

## 2022-06-08 NOTE — HISTORY OF PRESENT ILLNESS
[FreeTextEntry1] : 41 year y/o F pt, with Low TSH (from lab completed 6 weeks ago and on 22), referred by Dr. Karl Blank, presents today to establish endocrine care. \par Other PMHx: n/a\par PSHx: \par FHx: Mother: Stomach CA. Father: MI. No FHx of: Autoimmune disorders\par SHx: No EtOH use. Former smoker- 5 yrs; quit early 20s. 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 . \par Vaccinations: COVID (including booster)\par 2nd pregnancy with IVF, 2 y/o.\par NKDA\par Dr. Karl Blank ([phone])\par \par 2022\par Pt is here because she has low TSH levels twice over the past 2 months.  She is 16 weeks pregnant.\par This is her second pregnancy (IVF-induced). Of note, her TSH was low-normal during her first pregnancy 3-4 yrs ago, which was naturally conceived.  During her first trimester patient denies symptoms of hyperemesis gravidarum\par Today, pt is feeling well. Denies nausea, abd discomfort, or palpitations, no excessive eyes tearing\par Current Medications: Prenatal vitamin qd, baby ASA qd, Vit D qd \par \par Labs:\par - 22: TSH 0.11 (L), Free T4 1.3

## 2022-07-15 ENCOUNTER — ASOB RESULT (OUTPATIENT)
Age: 42
End: 2022-07-15

## 2022-07-15 ENCOUNTER — APPOINTMENT (OUTPATIENT)
Dept: ANTEPARTUM | Facility: CLINIC | Age: 42
End: 2022-07-15

## 2022-07-15 PROCEDURE — 76816 OB US FOLLOW-UP PER FETUS: CPT

## 2022-07-20 ENCOUNTER — APPOINTMENT (OUTPATIENT)
Dept: ENDOCRINOLOGY | Facility: CLINIC | Age: 42
End: 2022-07-20

## 2022-07-20 VITALS
DIASTOLIC BLOOD PRESSURE: 62 MMHG | HEART RATE: 84 BPM | SYSTOLIC BLOOD PRESSURE: 98 MMHG | BODY MASS INDEX: 24.37 KG/M2 | WEIGHT: 129 LBS

## 2022-07-20 DIAGNOSIS — E05.90 THYROTOXICOSIS, UNSPECIFIED W/OUT THYROTOXIC CRISIS OR STORM: ICD-10-CM

## 2022-07-20 PROCEDURE — 99214 OFFICE O/P EST MOD 30 MIN: CPT

## 2022-07-20 RX ORDER — DOXYCYCLINE HYCLATE 100 MG/1
100 TABLET ORAL
Refills: 0 | Status: COMPLETED | COMMUNITY
Start: 2022-02-22 | End: 2022-07-20

## 2022-07-20 RX ORDER — METHYLPREDNISOLONE 16 MG/1
16 TABLET ORAL
Refills: 0 | Status: COMPLETED | COMMUNITY
Start: 2022-02-22 | End: 2022-07-20

## 2022-07-20 RX ORDER — ESTRADIOL 2 MG/1
2 TABLET ORAL
Refills: 0 | Status: COMPLETED | COMMUNITY
Start: 2022-02-10 | End: 2022-07-20

## 2022-07-22 NOTE — REVIEW OF SYSTEMS
[Negative] : Constitutional [Palpitations] : no palpitations [Nausea] : no nausea [Insomnia] : no insomnia [Excessive Sweating] : no excessive sweating

## 2022-07-22 NOTE — ADDENDUM
[FreeTextEntry1] : I Seanfior Rose act soley as a scribe for Dr. Washington Lee on this date. 07/20/2022

## 2022-07-22 NOTE — HISTORY OF PRESENT ILLNESS
[FreeTextEntry1] : 42 y/o F pt, with Low TSH (from lab completed 6 weeks ago and on 22), referred by Dr. Karl Blank, presents today to establish endocrine care. \par Other PMHx: n/a\par PSHx: \par FHx: Mother: Stomach CA. Father: MI. No FHx of: Autoimmune disorders- Graves disease. \par SHx: No EtOH use. Former smoker- 5 yrs; quit early 20s. 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 . \par Vaccinations: COVID (including booster)\par 2nd pregnancy with IVF, 2 y/o.\par NKDA\par Dr. Karl Blank ([phone])\par \par 2022\par Pt is here because she has low TSH levels twice over the past 2 months. She is 16 weeks pregnant.\par This is her second pregnancy (IVF-induced). Of note, her TSH was low-normal during her first pregnancy 3-4 yrs ago, which was naturally conceived.  During her first trimester patient denies symptoms of hyperemesis gravidarum\par Today, pt is feeling well. Denies nausea, abd discomfort, or palpitations, no excessive eyes tearing\par \par 2022\par Today, pt is 22 weeks pregnant. She saw the OBGYN 3 weeks ago. Pt feels well, with no physical complaints. \par Denies insomnia, palpitations, hyperhidrosis, and nausea.\par  \par Current Medications: Prenatal vitamin qd, baby ASA qd, Vit D qd \par

## 2022-07-22 NOTE — PHYSICAL EXAM
[Alert] : alert [Normal Sclera/Conjunctiva] : normal sclera/conjunctiva [Normal Outer Ear/Nose] : the ears and nose were normal in appearance [No Respiratory Distress] : no respiratory distress [Normal Rate] : heart rate was normal [Regular Rhythm] : with a regular rhythm [No Edema] : no peripheral edema [Soft] : abdomen soft [Spine Straight] : spine straight [No Stigmata of Cushings Syndrome] : no stigmata of Cushings Syndrome [Normal Gait] : normal gait [Normal Reflexes] : deep tendon reflexes were 2+ and symmetric [No Tremors] : no tremors [Oriented x3] : oriented to person, place, and time [No Proptosis] : no proptosis [No Neck Mass] : no neck mass was observed [Thyroid Not Enlarged] : the thyroid was not enlarged [No Thyroid Nodules] : no palpable thyroid nodules [Acanthosis Nigricans] : no acanthosis nigricans [de-identified] : \par 2 nodularities palpated in R lobe, ~ 0.8 cm round and small.  [de-identified] : No hand tremors.

## 2022-07-22 NOTE — PAST MEDICAL HISTORY
[Total Preg ___] : G[unfilled] [AB Induced ___] : elective abortions: [unfilled]  [Live Births ___] : P[unfilled]  [Living ___] : Living: [unfilled] [FreeTextEntry5] :

## 2022-07-22 NOTE — END OF VISIT
[FreeTextEntry3] : All medical record entries made by the Scribe were at my, Dr. Washington Lee, direction and personally dictated by me on 07/20/2022. I have reviewed the chart and agree that the record accurately reflects my personal performance of the history, physical exam, assessment and plan. I have also personally directed, reviewed and agreed with the chart.  [Time Spent: ___ minutes] : I have spent [unfilled] minutes of time on the encounter.

## 2022-08-01 ENCOUNTER — TRANSCRIPTION ENCOUNTER (OUTPATIENT)
Age: 42
End: 2022-08-01

## 2022-09-02 ENCOUNTER — ASOB RESULT (OUTPATIENT)
Age: 42
End: 2022-09-02

## 2022-09-02 ENCOUNTER — APPOINTMENT (OUTPATIENT)
Dept: ANTEPARTUM | Facility: CLINIC | Age: 42
End: 2022-09-02

## 2022-09-02 PROCEDURE — 76816 OB US FOLLOW-UP PER FETUS: CPT

## 2022-09-02 PROCEDURE — 76819 FETAL BIOPHYS PROFIL W/O NST: CPT

## 2022-09-30 ENCOUNTER — APPOINTMENT (OUTPATIENT)
Dept: ANTEPARTUM | Facility: CLINIC | Age: 42
End: 2022-09-30

## 2022-09-30 ENCOUNTER — ASOB RESULT (OUTPATIENT)
Age: 42
End: 2022-09-30

## 2022-09-30 PROCEDURE — 76816 OB US FOLLOW-UP PER FETUS: CPT

## 2022-09-30 PROCEDURE — 76819 FETAL BIOPHYS PROFIL W/O NST: CPT | Mod: 59

## 2022-10-14 ENCOUNTER — OUTPATIENT (OUTPATIENT)
Dept: OUTPATIENT SERVICES | Facility: HOSPITAL | Age: 42
LOS: 1 days | End: 2022-10-14
Payer: COMMERCIAL

## 2022-10-14 DIAGNOSIS — Z3A.00 WEEKS OF GESTATION OF PREGNANCY NOT SPECIFIED: ICD-10-CM

## 2022-10-14 DIAGNOSIS — O26.899 OTHER SPECIFIED PREGNANCY RELATED CONDITIONS, UNSPECIFIED TRIMESTER: ICD-10-CM

## 2022-10-14 PROCEDURE — 99214 OFFICE O/P EST MOD 30 MIN: CPT

## 2022-10-28 ENCOUNTER — ASOB RESULT (OUTPATIENT)
Age: 42
End: 2022-10-28

## 2022-10-28 ENCOUNTER — APPOINTMENT (OUTPATIENT)
Dept: ANTEPARTUM | Facility: CLINIC | Age: 42
End: 2022-10-28

## 2022-10-28 PROCEDURE — 76816 OB US FOLLOW-UP PER FETUS: CPT

## 2022-10-28 PROCEDURE — 76819 FETAL BIOPHYS PROFIL W/O NST: CPT | Mod: 59

## 2022-11-03 ENCOUNTER — APPOINTMENT (OUTPATIENT)
Dept: ANTEPARTUM | Facility: CLINIC | Age: 42
End: 2022-11-03

## 2022-11-03 ENCOUNTER — ASOB RESULT (OUTPATIENT)
Age: 42
End: 2022-11-03

## 2022-11-03 PROCEDURE — 76819 FETAL BIOPHYS PROFIL W/O NST: CPT

## 2022-11-10 ENCOUNTER — ASOB RESULT (OUTPATIENT)
Age: 42
End: 2022-11-10

## 2022-11-10 ENCOUNTER — APPOINTMENT (OUTPATIENT)
Dept: ANTEPARTUM | Facility: CLINIC | Age: 42
End: 2022-11-10

## 2022-11-10 PROCEDURE — 76816 OB US FOLLOW-UP PER FETUS: CPT | Mod: 59

## 2022-11-10 PROCEDURE — 76819 FETAL BIOPHYS PROFIL W/O NST: CPT

## 2022-11-10 PROCEDURE — 76820 UMBILICAL ARTERY ECHO: CPT | Mod: 59

## 2022-11-15 ENCOUNTER — TRANSCRIPTION ENCOUNTER (OUTPATIENT)
Age: 42
End: 2022-11-15

## 2022-11-16 ENCOUNTER — INPATIENT (INPATIENT)
Facility: HOSPITAL | Age: 42
LOS: 2 days | Discharge: HOME | End: 2022-11-19
Attending: OBSTETRICS & GYNECOLOGY | Admitting: OBSTETRICS & GYNECOLOGY
Payer: COMMERCIAL

## 2022-11-16 VITALS
OXYGEN SATURATION: 98 % | SYSTOLIC BLOOD PRESSURE: 118 MMHG | HEART RATE: 68 BPM | TEMPERATURE: 97 F | HEIGHT: 61 IN | DIASTOLIC BLOOD PRESSURE: 76 MMHG | RESPIRATION RATE: 18 BRPM | WEIGHT: 149.91 LBS

## 2022-11-16 LAB
BASOPHILS # BLD AUTO: 0.02 K/UL — SIGNIFICANT CHANGE UP (ref 0–0.2)
BASOPHILS NFR BLD AUTO: 0.3 % — SIGNIFICANT CHANGE UP (ref 0–2)
BLD GP AB SCN SERPL QL: NEGATIVE — SIGNIFICANT CHANGE UP
EOSINOPHIL # BLD AUTO: 0.02 K/UL — SIGNIFICANT CHANGE UP (ref 0–0.5)
EOSINOPHIL NFR BLD AUTO: 0.3 % — SIGNIFICANT CHANGE UP (ref 0–6)
HCT VFR BLD CALC: 35.6 % — SIGNIFICANT CHANGE UP (ref 34.5–45)
HGB BLD-MCNC: 11.9 G/DL — SIGNIFICANT CHANGE UP (ref 11.5–15.5)
IMM GRANULOCYTES NFR BLD AUTO: 0.5 % — SIGNIFICANT CHANGE UP (ref 0–0.9)
LYMPHOCYTES # BLD AUTO: 0.9 K/UL — LOW (ref 1–3.3)
LYMPHOCYTES # BLD AUTO: 14.5 % — SIGNIFICANT CHANGE UP (ref 13–44)
MCHC RBC-ENTMCNC: 31.4 PG — SIGNIFICANT CHANGE UP (ref 27–34)
MCHC RBC-ENTMCNC: 33.4 GM/DL — SIGNIFICANT CHANGE UP (ref 32–36)
MCV RBC AUTO: 93.9 FL — SIGNIFICANT CHANGE UP (ref 80–100)
MONOCYTES # BLD AUTO: 0.56 K/UL — SIGNIFICANT CHANGE UP (ref 0–0.9)
MONOCYTES NFR BLD AUTO: 9 % — SIGNIFICANT CHANGE UP (ref 2–14)
NEUTROPHILS # BLD AUTO: 4.67 K/UL — SIGNIFICANT CHANGE UP (ref 1.8–7.4)
NEUTROPHILS NFR BLD AUTO: 75.4 % — SIGNIFICANT CHANGE UP (ref 43–77)
NRBC # BLD: 0 /100 WBCS — SIGNIFICANT CHANGE UP (ref 0–0)
PLATELET # BLD AUTO: 181 K/UL — SIGNIFICANT CHANGE UP (ref 150–400)
RBC # BLD: 3.79 M/UL — LOW (ref 3.8–5.2)
RBC # FLD: 12.6 % — SIGNIFICANT CHANGE UP (ref 10.3–14.5)
RH IG SCN BLD-IMP: POSITIVE — SIGNIFICANT CHANGE UP
RH IG SCN BLD-IMP: POSITIVE — SIGNIFICANT CHANGE UP
SARS-COV-2 RNA SPEC QL NAA+PROBE: DETECTED
T PALLIDUM AB TITR SER: NEGATIVE — SIGNIFICANT CHANGE UP
WBC # BLD: 6.2 K/UL — SIGNIFICANT CHANGE UP (ref 3.8–10.5)
WBC # FLD AUTO: 6.2 K/UL — SIGNIFICANT CHANGE UP (ref 3.8–10.5)

## 2022-11-16 DEVICE — ARISTA 3GR: Type: IMPLANTABLE DEVICE | Status: FUNCTIONAL

## 2022-11-16 DEVICE — SURGICEL FIBRILLAR 2 X 4": Type: IMPLANTABLE DEVICE | Status: FUNCTIONAL

## 2022-11-16 RX ORDER — SODIUM CHLORIDE 9 MG/ML
1000 INJECTION, SOLUTION INTRAVENOUS
Refills: 0 | Status: DISCONTINUED | OUTPATIENT
Start: 2022-11-16 | End: 2022-11-19

## 2022-11-16 RX ORDER — KETOROLAC TROMETHAMINE 30 MG/ML
30 SYRINGE (ML) INJECTION EVERY 6 HOURS
Refills: 0 | Status: DISCONTINUED | OUTPATIENT
Start: 2022-11-16 | End: 2022-11-17

## 2022-11-16 RX ORDER — ENOXAPARIN SODIUM 100 MG/ML
40 INJECTION SUBCUTANEOUS EVERY 24 HOURS
Refills: 0 | Status: DISCONTINUED | OUTPATIENT
Start: 2022-11-17 | End: 2022-11-19

## 2022-11-16 RX ORDER — ONDANSETRON 8 MG/1
4 TABLET, FILM COATED ORAL EVERY 6 HOURS
Refills: 0 | Status: DISCONTINUED | OUTPATIENT
Start: 2022-11-16 | End: 2022-11-19

## 2022-11-16 RX ORDER — LANOLIN
1 OINTMENT (GRAM) TOPICAL EVERY 6 HOURS
Refills: 0 | Status: DISCONTINUED | OUTPATIENT
Start: 2022-11-16 | End: 2022-11-19

## 2022-11-16 RX ORDER — DIPHENHYDRAMINE HCL 50 MG
25 CAPSULE ORAL EVERY 6 HOURS
Refills: 0 | Status: DISCONTINUED | OUTPATIENT
Start: 2022-11-16 | End: 2022-11-19

## 2022-11-16 RX ORDER — OXYCODONE HYDROCHLORIDE 5 MG/1
5 TABLET ORAL
Refills: 0 | Status: DISCONTINUED | OUTPATIENT
Start: 2022-11-16 | End: 2022-11-19

## 2022-11-16 RX ORDER — TETANUS TOXOID, REDUCED DIPHTHERIA TOXOID AND ACELLULAR PERTUSSIS VACCINE, ADSORBED 5; 2.5; 8; 8; 2.5 [IU]/.5ML; [IU]/.5ML; UG/.5ML; UG/.5ML; UG/.5ML
0.5 SUSPENSION INTRAMUSCULAR ONCE
Refills: 0 | Status: DISCONTINUED | OUTPATIENT
Start: 2022-11-16 | End: 2022-11-19

## 2022-11-16 RX ORDER — SIMETHICONE 80 MG/1
80 TABLET, CHEWABLE ORAL EVERY 4 HOURS
Refills: 0 | Status: DISCONTINUED | OUTPATIENT
Start: 2022-11-16 | End: 2022-11-19

## 2022-11-16 RX ORDER — OXYTOCIN 10 UNIT/ML
333.33 VIAL (ML) INJECTION
Qty: 20 | Refills: 0 | Status: DISCONTINUED | OUTPATIENT
Start: 2022-11-16 | End: 2022-11-19

## 2022-11-16 RX ORDER — IBUPROFEN 200 MG
600 TABLET ORAL EVERY 6 HOURS
Refills: 0 | Status: COMPLETED | OUTPATIENT
Start: 2022-11-16 | End: 2023-10-15

## 2022-11-16 RX ORDER — CEFAZOLIN SODIUM 1 G
2000 VIAL (EA) INJECTION ONCE
Refills: 0 | Status: COMPLETED | OUTPATIENT
Start: 2022-11-16 | End: 2022-11-16

## 2022-11-16 RX ORDER — SODIUM CHLORIDE 9 MG/ML
1000 INJECTION, SOLUTION INTRAVENOUS ONCE
Refills: 0 | Status: COMPLETED | OUTPATIENT
Start: 2022-11-16 | End: 2022-11-16

## 2022-11-16 RX ORDER — OXYCODONE HYDROCHLORIDE 5 MG/1
5 TABLET ORAL ONCE
Refills: 0 | Status: DISCONTINUED | OUTPATIENT
Start: 2022-11-16 | End: 2022-11-19

## 2022-11-16 RX ORDER — FAMOTIDINE 10 MG/ML
20 INJECTION INTRAVENOUS ONCE
Refills: 0 | Status: DISCONTINUED | OUTPATIENT
Start: 2022-11-16 | End: 2022-11-16

## 2022-11-16 RX ORDER — MAGNESIUM HYDROXIDE 400 MG/1
30 TABLET, CHEWABLE ORAL
Refills: 0 | Status: DISCONTINUED | OUTPATIENT
Start: 2022-11-16 | End: 2022-11-19

## 2022-11-16 RX ORDER — DEXAMETHASONE 0.5 MG/5ML
4 ELIXIR ORAL EVERY 6 HOURS
Refills: 0 | Status: DISCONTINUED | OUTPATIENT
Start: 2022-11-16 | End: 2022-11-19

## 2022-11-16 RX ORDER — CITRIC ACID/SODIUM CITRATE 300-500 MG
30 SOLUTION, ORAL ORAL ONCE
Refills: 0 | Status: DISCONTINUED | OUTPATIENT
Start: 2022-11-16 | End: 2022-11-16

## 2022-11-16 RX ORDER — OXYTOCIN 10 UNIT/ML
333.33 VIAL (ML) INJECTION
Qty: 20 | Refills: 0 | Status: DISCONTINUED | OUTPATIENT
Start: 2022-11-16 | End: 2022-11-16

## 2022-11-16 RX ORDER — CHLORHEXIDINE GLUCONATE 213 G/1000ML
1 SOLUTION TOPICAL ONCE
Refills: 0 | Status: COMPLETED | OUTPATIENT
Start: 2022-11-16 | End: 2022-11-16

## 2022-11-16 RX ORDER — NALOXONE HYDROCHLORIDE 4 MG/.1ML
0.1 SPRAY NASAL
Refills: 0 | Status: DISCONTINUED | OUTPATIENT
Start: 2022-11-16 | End: 2022-11-19

## 2022-11-16 RX ORDER — ACETAMINOPHEN 500 MG
975 TABLET ORAL
Refills: 0 | Status: DISCONTINUED | OUTPATIENT
Start: 2022-11-16 | End: 2022-11-19

## 2022-11-16 RX ORDER — SODIUM CHLORIDE 9 MG/ML
1000 INJECTION, SOLUTION INTRAVENOUS
Refills: 0 | Status: DISCONTINUED | OUTPATIENT
Start: 2022-11-16 | End: 2022-11-16

## 2022-11-16 RX ADMIN — FAMOTIDINE 20 MILLIGRAM(S): 10 INJECTION INTRAVENOUS at 13:30

## 2022-11-16 RX ADMIN — Medication 975 MILLIGRAM(S): at 21:45

## 2022-11-16 RX ADMIN — Medication 30 MILLILITER(S): at 13:40

## 2022-11-16 RX ADMIN — Medication 100 MILLIGRAM(S): at 13:30

## 2022-11-16 RX ADMIN — Medication 30 MILLIGRAM(S): at 19:10

## 2022-11-16 RX ADMIN — SODIUM CHLORIDE 2000 MILLILITER(S): 9 INJECTION, SOLUTION INTRAVENOUS at 10:30

## 2022-11-16 RX ADMIN — SODIUM CHLORIDE 125 MILLILITER(S): 9 INJECTION, SOLUTION INTRAVENOUS at 12:45

## 2022-11-16 RX ADMIN — CHLORHEXIDINE GLUCONATE 1 APPLICATION(S): 213 SOLUTION TOPICAL at 13:30

## 2022-11-16 RX ADMIN — Medication 30 MILLIGRAM(S): at 19:25

## 2022-11-16 NOTE — PATIENT PROFILE OB - TOBACCO USE
Never smoker V-Y Plasty Text: The defect edges were debeveled with a #15 scalpel blade.  Given the location of the defect, shape of the defect and the proximity to free margins an V-Y advancement flap was deemed most appropriate.  Using a sterile surgical marker, an appropriate advancement flap was drawn incorporating the defect and placing the expected incisions within the relaxed skin tension lines where possible.    The area thus outlined was incised deep to adipose tissue with a #15 scalpel blade.  The skin margins were undermined to an appropriate distance in all directions utilizing iris scissors.

## 2022-11-16 NOTE — PRE-ANESTHESIA EVALUATION ADULT - NSANTHOSAYNRD_GEN_A_CORE
No. TIARA screening performed.  STOP BANG Legend: 0-2 = LOW Risk; 3-4 = INTERMEDIATE Risk; 5-8 = HIGH Risk

## 2022-11-16 NOTE — PRE-ANESTHESIA EVALUATION ADULT - NSANTHPMHFT_GEN_ALL_CORE
Cardiac: Denies HTN, HLD, MI/Angina/Heart Failure, Arrhythmia, Valvular Disorder/Murmur. >4 METS  Pulmonary: Current COVID infection, active symptoms. Denies asthma, COPD, TIARA  Renal: Denies kidney dysfunction  Hepatic: Denies liver dysfunction  Endocrine: Denies diabetes/thyroid dysfunction  Neurologic: Denies stroke/seizure history  Hematologic: On Aspirin 81 mg PO q Day. Denies anemia, blood clotting disorder, blood thinning medication aside from Aspirin 81 mg PO q Day    PSH: [place] teeth removal,  section x 1

## 2022-11-17 ENCOUNTER — TRANSCRIPTION ENCOUNTER (OUTPATIENT)
Age: 42
End: 2022-11-17

## 2022-11-17 ENCOUNTER — APPOINTMENT (OUTPATIENT)
Dept: ANTEPARTUM | Facility: CLINIC | Age: 42
End: 2022-11-17

## 2022-11-17 LAB
BASOPHILS # BLD AUTO: 0.01 K/UL — SIGNIFICANT CHANGE UP (ref 0–0.2)
BASOPHILS NFR BLD AUTO: 0.1 % — SIGNIFICANT CHANGE UP (ref 0–2)
COVID-19 SPIKE DOMAIN AB INTERP: POSITIVE
COVID-19 SPIKE DOMAIN ANTIBODY RESULT: >250 U/ML — HIGH
EOSINOPHIL # BLD AUTO: 0 K/UL — SIGNIFICANT CHANGE UP (ref 0–0.5)
EOSINOPHIL NFR BLD AUTO: 0 % — SIGNIFICANT CHANGE UP (ref 0–6)
HCT VFR BLD CALC: 30.1 % — LOW (ref 34.5–45)
HGB BLD-MCNC: 10.2 G/DL — LOW (ref 11.5–15.5)
IMM GRANULOCYTES NFR BLD AUTO: 0.4 % — SIGNIFICANT CHANGE UP (ref 0–0.9)
LYMPHOCYTES # BLD AUTO: 1.45 K/UL — SIGNIFICANT CHANGE UP (ref 1–3.3)
LYMPHOCYTES # BLD AUTO: 12.9 % — LOW (ref 13–44)
MCHC RBC-ENTMCNC: 31.5 PG — SIGNIFICANT CHANGE UP (ref 27–34)
MCHC RBC-ENTMCNC: 33.9 GM/DL — SIGNIFICANT CHANGE UP (ref 32–36)
MCV RBC AUTO: 92.9 FL — SIGNIFICANT CHANGE UP (ref 80–100)
MONOCYTES # BLD AUTO: 0.63 K/UL — SIGNIFICANT CHANGE UP (ref 0–0.9)
MONOCYTES NFR BLD AUTO: 5.6 % — SIGNIFICANT CHANGE UP (ref 2–14)
NEUTROPHILS # BLD AUTO: 9.12 K/UL — HIGH (ref 1.8–7.4)
NEUTROPHILS NFR BLD AUTO: 81 % — HIGH (ref 43–77)
NRBC # BLD: 0 /100 WBCS — SIGNIFICANT CHANGE UP (ref 0–0)
PLATELET # BLD AUTO: 124 K/UL — LOW (ref 150–400)
RBC # BLD: 3.24 M/UL — LOW (ref 3.8–5.2)
RBC # FLD: 12.6 % — SIGNIFICANT CHANGE UP (ref 10.3–14.5)
SARS-COV-2 IGG+IGM SERPL QL IA: >250 U/ML — HIGH
SARS-COV-2 IGG+IGM SERPL QL IA: POSITIVE
WBC # BLD: 11.25 K/UL — HIGH (ref 3.8–10.5)
WBC # FLD AUTO: 11.25 K/UL — HIGH (ref 3.8–10.5)

## 2022-11-17 RX ORDER — IBUPROFEN 200 MG
600 TABLET ORAL EVERY 6 HOURS
Refills: 0 | Status: DISCONTINUED | OUTPATIENT
Start: 2022-11-17 | End: 2022-11-19

## 2022-11-17 RX ORDER — IBUPROFEN 200 MG
1 TABLET ORAL
Qty: 0 | Refills: 0 | DISCHARGE
Start: 2022-11-17

## 2022-11-17 RX ORDER — ACETAMINOPHEN 500 MG
3 TABLET ORAL
Qty: 0 | Refills: 0 | DISCHARGE
Start: 2022-11-17

## 2022-11-17 RX ADMIN — Medication 30 MILLIGRAM(S): at 06:16

## 2022-11-17 RX ADMIN — Medication 30 MILLIGRAM(S): at 11:59

## 2022-11-17 RX ADMIN — Medication 30 MILLIGRAM(S): at 06:50

## 2022-11-17 RX ADMIN — Medication 975 MILLIGRAM(S): at 10:00

## 2022-11-17 RX ADMIN — Medication 975 MILLIGRAM(S): at 21:45

## 2022-11-17 RX ADMIN — Medication 975 MILLIGRAM(S): at 04:00

## 2022-11-17 RX ADMIN — Medication 600 MILLIGRAM(S): at 17:42

## 2022-11-17 RX ADMIN — Medication 975 MILLIGRAM(S): at 09:00

## 2022-11-17 RX ADMIN — Medication 600 MILLIGRAM(S): at 18:59

## 2022-11-17 RX ADMIN — Medication 600 MILLIGRAM(S): at 23:54

## 2022-11-17 RX ADMIN — Medication 30 MILLIGRAM(S): at 00:17

## 2022-11-17 RX ADMIN — Medication 30 MILLIGRAM(S): at 01:05

## 2022-11-17 RX ADMIN — ENOXAPARIN SODIUM 40 MILLIGRAM(S): 100 INJECTION SUBCUTANEOUS at 06:17

## 2022-11-17 RX ADMIN — Medication 975 MILLIGRAM(S): at 14:43

## 2022-11-17 RX ADMIN — Medication 975 MILLIGRAM(S): at 21:15

## 2022-11-17 RX ADMIN — Medication 30 MILLIGRAM(S): at 13:00

## 2022-11-17 RX ADMIN — Medication 975 MILLIGRAM(S): at 15:50

## 2022-11-17 RX ADMIN — Medication 975 MILLIGRAM(S): at 03:11

## 2022-11-17 NOTE — DISCHARGE NOTE OB - PATIENT PORTAL LINK FT
You can access the FollowMyHealth Patient Portal offered by [facility] by registering at the following website: [email or web address]. By joining [employer]’s FollowMyHealth portal, you will also be able to view your health information using other applications (apps) compatible with our system.

## 2022-11-17 NOTE — LACTATION INITIAL EVALUATION - DELIVERY MODE
Hand expressed colostrum into a medicine cup, ellis up into a syringe for transport to [facility] to feed baby

## 2022-11-17 NOTE — DISCHARGE NOTE OB - CARE PLAN
1 Principal Discharge DX:	 delivery delivered  Assessment and plan of treatment:	 delivery, meeting all postoperative milestones.  Please follow-up with your OB doctor within 1-2 weeks.  You can resume a regular diet at home and may continue your prenatal vitamins as directed.  Please place nothing in the vagina for 6 weeks (no tampons, sex, douching, tub baths, swimming pools, etc).  If you have severe headaches and/or vision changes, heavy bleeding, or chest pain, please call your provider or go to the nearest Emergency Department.  Please call your OB with any signs of symptoms of infection including fever > 100.4 degrees, severe pain, malodorous vaginal discharge or heavy bleeding requiring more than 1-2 pads/hour.  You can take Motrin 600mg orally every 6 hours for pain as needed.  Secondary Diagnosis:	Acute postoperative anemia due to expected blood loss  Secondary Diagnosis:	2019 novel coronavirus disease (COVID-19)

## 2022-11-17 NOTE — LACTATION INITIAL EVALUATION - PRO FEEDING PLAN INFANT OB
However, mother on isolation without baby for covid+ and symptomatic/initiation of breastfeeding/breast milk feeding

## 2022-11-17 NOTE — LACTATION INITIAL EVALUATION - NS LACT CON REASON FOR REQ
Mother is on isolation for covid + and symptomatic.  Baby is in well baby nursery, given bottles of formula up until now. Taught and assisted mother to manually express her colostrum, as the double electric breast pump was yielding no colostrum as of yet.  I discussed performing hand expression every 2-3 hours around the clock and providing the colostrum for baby's consumption while the dyad is .  Mom will continue to pump and perform hand expresion afterwards.  Disuccsed the plan with the mom's RN. Encouraged mom to reach out to lactation with any questions or concerns, and  also encouraged to reach out if and when mom/baby are reuinted in mother's room for a latch check.  Referred mother for telelactation for further support once she is home./multiparous mom/staff request

## 2022-11-17 NOTE — DISCHARGE NOTE OB - NS MD DC FALL RISK RISK
For information on Fall & Injury Prevention, visit: [email or web address] OR  [email or web address] OR  https://www.cdc.gov/steadi/patient.html

## 2022-11-17 NOTE — DISCHARGE NOTE OB - HOSPITAL COURSE
Admitted for repeat  section.  Uncomplicated surgery and postoperative course.  Acute blood loss anemia noted on post-operative CBC.  Patient stable with normal vital signs.  No intervention necessary.  COVID positive.

## 2022-11-18 RX ADMIN — ENOXAPARIN SODIUM 40 MILLIGRAM(S): 100 INJECTION SUBCUTANEOUS at 06:32

## 2022-11-18 RX ADMIN — Medication 600 MILLIGRAM(S): at 13:00

## 2022-11-18 RX ADMIN — Medication 600 MILLIGRAM(S): at 23:37

## 2022-11-18 RX ADMIN — Medication 600 MILLIGRAM(S): at 06:55

## 2022-11-18 RX ADMIN — Medication 975 MILLIGRAM(S): at 10:00

## 2022-11-18 RX ADMIN — Medication 600 MILLIGRAM(S): at 06:33

## 2022-11-18 RX ADMIN — Medication 600 MILLIGRAM(S): at 18:08

## 2022-11-18 RX ADMIN — Medication 600 MILLIGRAM(S): at 12:23

## 2022-11-18 RX ADMIN — Medication 600 MILLIGRAM(S): at 19:08

## 2022-11-18 RX ADMIN — Medication 975 MILLIGRAM(S): at 03:51

## 2022-11-18 RX ADMIN — Medication 975 MILLIGRAM(S): at 15:10

## 2022-11-18 RX ADMIN — Medication 975 MILLIGRAM(S): at 21:18

## 2022-11-18 RX ADMIN — Medication 975 MILLIGRAM(S): at 03:25

## 2022-11-18 RX ADMIN — Medication 600 MILLIGRAM(S): at 00:31

## 2022-11-18 RX ADMIN — Medication 975 MILLIGRAM(S): at 09:15

## 2022-11-18 RX ADMIN — Medication 975 MILLIGRAM(S): at 16:00

## 2022-11-19 VITALS
RESPIRATION RATE: 17 BRPM | SYSTOLIC BLOOD PRESSURE: 125 MMHG | OXYGEN SATURATION: 99 % | TEMPERATURE: 98 F | HEART RATE: 71 BPM | DIASTOLIC BLOOD PRESSURE: 72 MMHG

## 2022-11-19 PROCEDURE — 36415 COLL VENOUS BLD VENIPUNCTURE: CPT

## 2022-11-19 PROCEDURE — 85025 COMPLETE CBC W/AUTO DIFF WBC: CPT

## 2022-11-19 PROCEDURE — 59050 FETAL MONITOR W/REPORT: CPT

## 2022-11-19 PROCEDURE — 87635 SARS-COV-2 COVID-19 AMP PRB: CPT

## 2022-11-19 PROCEDURE — 86780 TREPONEMA PALLIDUM: CPT

## 2022-11-19 PROCEDURE — 86900 BLOOD TYPING SEROLOGIC ABO: CPT

## 2022-11-19 PROCEDURE — 86901 BLOOD TYPING SEROLOGIC RH(D): CPT

## 2022-11-19 PROCEDURE — C1889: CPT

## 2022-11-19 PROCEDURE — 86769 SARS-COV-2 COVID-19 ANTIBODY: CPT

## 2022-11-19 PROCEDURE — 86850 RBC ANTIBODY SCREEN: CPT

## 2022-11-19 RX ADMIN — Medication 975 MILLIGRAM(S): at 03:34

## 2022-11-19 RX ADMIN — Medication 600 MILLIGRAM(S): at 06:43

## 2022-11-19 RX ADMIN — Medication 975 MILLIGRAM(S): at 09:10

## 2022-11-19 RX ADMIN — Medication 975 MILLIGRAM(S): at 01:00

## 2022-11-19 RX ADMIN — ENOXAPARIN SODIUM 40 MILLIGRAM(S): 100 INJECTION SUBCUTANEOUS at 06:43

## 2022-11-19 RX ADMIN — Medication 600 MILLIGRAM(S): at 12:19

## 2022-11-19 RX ADMIN — Medication 600 MILLIGRAM(S): at 13:19

## 2022-11-19 NOTE — PROGRESS NOTE ADULT - SUBJECTIVE AND OBJECTIVE BOX
Patient evaluated at bedside.   She reports pain is well controlled with OPM.  She has been ambulating without assistance, voiding spontaneously, passing gas, tolerating regular diet and is breastfeeding.  She denies HA, dizziness, CP, palpitations, SOB, n/v, or heavy vaginal bleeding.    Physical Exam:  Vital Signs Last 24 Hrs  T(C): 36.7 (17 Nov 2022 21:08), Max: 36.7 (17 Nov 2022 18:00)  T(F): 98 (17 Nov 2022 21:08), Max: 98 (17 Nov 2022 18:00)  HR: 71 (17 Nov 2022 21:08) (69 - 72)  BP: 115/69 (17 Nov 2022 21:08) (105/71 - 115/69)  BP(mean): --  RR: 16 (17 Nov 2022 21:08) (16 - 18)  SpO2: 98% (17 Nov 2022 21:08) (98% - 99%)        Gen: NAD  Abd: + BS, soft, nontender, nondistended, no rebound or guarding  Incision clean, dry and intact  uterus firm at midline below the umbilicus  : lochia WNL  Extremities: no calf tenderness                          10.2   11.25 )-----------( 124      ( 17 Nov 2022 08:40 )             30.1               
Patient evaluated at bedside.   She reports pain is well controlled with OPM.  She has been ambulating without assistance, voiding spontaneously, passing gas, tolerating regular diet and is breastfeeding.  She denies HA, dizziness, CP, palpitations, SOB, n/v, or heavy vaginal bleeding.    Physical Exam:  Vital Signs Last 24 Hrs  T(C): 36.7 (19 Nov 2022 07:07), Max: 36.9 (18 Nov 2022 09:45)  T(F): 98 (19 Nov 2022 07:07), Max: 98.4 (18 Nov 2022 09:45)  HR: 66 (19 Nov 2022 07:07) (64 - 78)  BP: 121/70 (19 Nov 2022 07:07) (116/72 - 134/84)  BP(mean): --  RR: 16 (19 Nov 2022 07:07) (16 - 18)  SpO2: 99% (19 Nov 2022 07:07) (97% - 99%)    Parameters below as of 18 Nov 2022 22:39  Patient On (Oxygen Delivery Method): room air        Gen: NAD  Abd: + BS, soft, nontender, nondistended, no rebound or guarding  Incision clean, dry and intact  uterus firm at midline below the umbilicus  : lochia WNL  Extremities: no calf tenderness                
Patient evaluated at bedside.   She reports pain is well controlled. Ravi removed this AM. Passing flatus yet.  Not OOB yet.  She denies HA, dizziness, CP, palpitations, SOB, n/v, or heavy vaginal bleeding.    Physical Exam:  Vital Signs Last 24 Hrs  T(C): 36.9 (17 Nov 2022 06:06), Max: 36.9 (17 Nov 2022 06:06)  T(F): 98.4 (17 Nov 2022 06:06), Max: 98.4 (17 Nov 2022 06:06)  HR: 77 (17 Nov 2022 06:06) (60 - 77)  BP: 104/67 (17 Nov 2022 06:06) (96/65 - 139/53)  BP(mean): --  RR: 18 (17 Nov 2022 06:06) (16 - 18)  SpO2: 95% (17 Nov 2022 06:06) (95% - 99%)    Parameters below as of 17 Nov 2022 06:06  Patient On (Oxygen Delivery Method): room air        Gen: NAD  Abd: + BS, soft, nontender, nondistended, no rebound or guarding  Incision clean, dry and intact, compression dressing removed  uterus firm at midline below the umbilicus  : lochia WNL  Extremities: no calf tenderness                          11.9   6.20  )-----------( 181      ( 16 Nov 2022 12:26 )             35.6

## 2022-11-19 NOTE — PROGRESS NOTE ADULT - ASSESSMENT
42y Female POD#1  s/p scheduled rC/S, Uncomplicated                                       1. Neuro/Pain:  toradol atc, tylenol atc, oxy prn  2  CV:  VS per routine, AM CBC pending  3. Pulm: Encourage ISS & Ambulation  4. GI:  Advance as yelena  5. : TOV this AM  6. DVT ppx: SCDs lovenox 40mg qd  7. Dispo: POD #3 or #4
42y Female POD#2  s/p C/S, Uncomplicated                                       1. Neuro/Pain:  OPM  2  CV:  VS per routine  3. Pulm: Encourage ISS & Ambulation  4. GI:  Reg  5. : Voiding  6. DVT ppx: SCDs, lovenox 40mg qd  7. Dispo: POD #3 or #4
42y Female POD#3  s/p C/S, Uncomplicated                                       1. Neuro/Pain:  OPM  2  CV:  VS per routine  3. Pulm: Encourage ISS & Ambulation  4. GI:  Reg  5. : Voiding  6. DVT ppx: SCDs, lovenox 40mg qd  7. Dispo: POD #3 or #4

## 2022-11-22 ENCOUNTER — APPOINTMENT (OUTPATIENT)
Dept: ANTEPARTUM | Facility: CLINIC | Age: 42
End: 2022-11-22

## 2022-11-22 ENCOUNTER — NON-APPOINTMENT (OUTPATIENT)
Age: 42
End: 2022-11-22

## 2022-11-23 DIAGNOSIS — U07.1 COVID-19: ICD-10-CM

## 2022-11-23 DIAGNOSIS — O34.211 MATERNAL CARE FOR LOW TRANSVERSE SCAR FROM PREVIOUS CESAREAN DELIVERY: ICD-10-CM

## 2022-11-23 DIAGNOSIS — D62 ACUTE POSTHEMORRHAGIC ANEMIA: ICD-10-CM

## 2022-11-23 DIAGNOSIS — Z34.80 ENCOUNTER FOR SUPERVISION OF OTHER NORMAL PREGNANCY, UNSPECIFIED TRIMESTER: ICD-10-CM

## 2022-11-23 DIAGNOSIS — Z3A.39 39 WEEKS GESTATION OF PREGNANCY: ICD-10-CM

## 2022-11-23 DIAGNOSIS — Z28.09 IMMUNIZATION NOT CARRIED OUT BECAUSE OF OTHER CONTRAINDICATION: ICD-10-CM

## 2024-10-07 NOTE — PATIENT PROFILE OB - PATIENT REPRESENTATIVE NAME
PRE-SEDATION ASSESSMENT    CONSENT  Consent for procedure and sedation obtained: Yes    MEDICAL HISTORY  Significant medical/surgical history: Yes  Past Complications with Sedation/Anesthesia: No  Significant Family History: No  Smoking History: No  Alcohol/Drug abuse: No  Possible Pregnancy (LMP): No  Cardiac History: Yes  Respiratory History: Yes    PHYSICAL EXAM  History and Physical Reviewed: Patient has valid H&P within 30 days. I have reviewed and there are no changes.  Airway Anatomy : Class II  Heart : Normal  Lungs : Normal  LOC/Mental Status : Normal    OTHER FINDINGS    SEDATION RISK ASSESSMENT    [employer] Anesthesi[employer] (ASA) A Physical Status Classifications  (For emergency operations, add the letter E after the classification)        ASA 1 A normal healthy patient, (Healthy, non smoker, no or minimal alcohol use).   ASA 2 A patient with a mild systemic disease, (Mild diseases, no substantive functional limitations. E.g. obesity, smoker, occasional drinker, well controlled DM/HTN).   ASA 3 A patient with severe systemic disease (Substantive functional Limitations. One or more moderate to severe diseases. E.g. COPD, uncontrolled DM/HTN, morbid obesity, pacemaker, CAD, CVA).   ASA 4 A patient with severe systemic disease that is a constant threat to life  (Recent MI, CVA, or TIA (<3 months), ESRD, ongoing cardiac ischemia, DIC, ARDS, low ejection fraction).    ASA 5 A moribund patient who is not expected to survive without the operation (Ruptured aorta, massive trauma, multiple organ/systems pathology).   ASA 6 A brain dead patient for organ harvesting.      Risk Status ASA: Class II  Plan for Sedation: Moderate Sedation  EKG Monitoring: Yes    NARRATIVE FINDINGS  Risk Status ASA: Class II   Narrative Findings: Mallampati Class II     [place] [employer] Clinical Frailty Score:    Well/Managing Well.    Time out performed prior to the commencement of the procedure to verify  the patient and the procedure to be performed. Cath lab RN and  technician in attendance at the time of the time out.    POST PROCEDURE- POST-SEDATION ASSESSMENT:  -----------------------------------------------------------------------------  Tolerated procedure well.    Pre procedure diagnosis:     Post procedure Diagnosis: Same    Type of sedation: Moderate sedation    Estimated Blood Loss:  N/A    Specimen Removed:  N/A    Complications: None    Referral to Outpatient Cardiac Rehab and patient education benefits for Myocardial Infarction, Percutaneous Transluminal Coronary Angioplasty, Heart Valve Replacement, Coronary Stent, Heart Valve Repair, Coronary Artery Bypass Surgery, Heart Failure, Angina Pectoris, Heart or Heart-Lung Transplant:  Yes    Jan Lantigua
